# Patient Record
Sex: MALE | Race: BLACK OR AFRICAN AMERICAN
[De-identification: names, ages, dates, MRNs, and addresses within clinical notes are randomized per-mention and may not be internally consistent; named-entity substitution may affect disease eponyms.]

---

## 2022-06-16 ENCOUNTER — HOSPITAL ENCOUNTER (EMERGENCY)
Dept: HOSPITAL 97 - ER | Age: 27
LOS: 1 days | Discharge: HOME | End: 2022-06-17
Payer: COMMERCIAL

## 2022-06-16 DIAGNOSIS — R06.02: ICD-10-CM

## 2022-06-16 DIAGNOSIS — R07.9: Primary | ICD-10-CM

## 2022-06-16 LAB
ALBUMIN SERPL BCP-MCNC: 3.5 G/DL (ref 3.4–5)
ALP SERPL-CCNC: 67 U/L (ref 45–117)
ALT SERPL W P-5'-P-CCNC: 19 U/L (ref 12–78)
AST SERPL W P-5'-P-CCNC: 18 U/L (ref 15–37)
BUN BLD-MCNC: 20 MG/DL (ref 7–18)
GLUCOSE SERPLBLD-MCNC: 150 MG/DL (ref 74–106)
HCT VFR BLD CALC: 38.7 % (ref 39.6–49)
INR BLD: 1.15
LYMPHOCYTES # SPEC AUTO: 2 K/UL (ref 0.7–4.9)
MAGNESIUM SERPL-MCNC: 2.3 MG/DL (ref 1.8–2.4)
NT-PROBNP SERPL-MCNC: 21 PG/ML (ref ?–125)
PMV BLD: 8.5 FL (ref 7.6–11.3)
POTASSIUM SERPL-SCNC: 4.3 MMOL/L (ref 3.5–5.1)
RBC # BLD: 4.71 M/UL (ref 4.33–5.43)
TROPONIN I SERPL HS-MCNC: 5 PG/ML (ref ?–58.9)

## 2022-06-16 PROCEDURE — 36415 COLL VENOUS BLD VENIPUNCTURE: CPT

## 2022-06-16 PROCEDURE — 83880 ASSAY OF NATRIURETIC PEPTIDE: CPT

## 2022-06-16 PROCEDURE — 85025 COMPLETE CBC W/AUTO DIFF WBC: CPT

## 2022-06-16 PROCEDURE — 80076 HEPATIC FUNCTION PANEL: CPT

## 2022-06-16 PROCEDURE — 99284 EMERGENCY DEPT VISIT MOD MDM: CPT

## 2022-06-16 PROCEDURE — 80048 BASIC METABOLIC PNL TOTAL CA: CPT

## 2022-06-16 PROCEDURE — 83735 ASSAY OF MAGNESIUM: CPT

## 2022-06-16 PROCEDURE — 93005 ELECTROCARDIOGRAM TRACING: CPT

## 2022-06-16 PROCEDURE — 84484 ASSAY OF TROPONIN QUANT: CPT

## 2022-06-16 PROCEDURE — 94640 AIRWAY INHALATION TREATMENT: CPT

## 2022-06-16 PROCEDURE — 85610 PROTHROMBIN TIME: CPT

## 2022-06-16 PROCEDURE — 96374 THER/PROPH/DIAG INJ IV PUSH: CPT

## 2022-06-16 PROCEDURE — 71045 X-RAY EXAM CHEST 1 VIEW: CPT

## 2022-06-16 NOTE — RAD REPORT
EXAM DESCRIPTION:  RAD - Chest Single View - 6/16/2022 10:05 pm

 

CLINICAL HISTORY:  sob, chest pain

 

COMPARISON:  No comparisons

 

FINDINGS:  Lines: None.

Lungs: No evidence of edema or pneumonia.

Pleural: No significant pleural effusions or pneumothorax.

Cardiac: The heart size is within normal limits.

Bones: No acute fractures.

Other:

 

IMPRESSION:  No acute cardiopulmonary disease.

## 2022-06-17 VITALS — DIASTOLIC BLOOD PRESSURE: 91 MMHG | SYSTOLIC BLOOD PRESSURE: 147 MMHG

## 2022-06-17 VITALS — TEMPERATURE: 97.6 F

## 2022-06-17 VITALS — OXYGEN SATURATION: 100 %

## 2022-06-17 NOTE — EDPHYS
Physician Documentation                                                                           

 [facility]                                                                 

Name: Alphonse Lugo                                                                               

Age: 26 yrs                                                                                       

Sex: Male                                                                                         

: 1995                                                                                   

MRN: [id number]                                                                                   

Arrival Date: 2022                                                                          

Time: 21:41                                                                                       

Account#: [id number]                                                                            

Bed 15                                                                                            

Private MD:                                                                                       

ED Physician Jose Manuel Hays                                                                      

HPI:                                                                                              

                                                                                             

00:05 This 26 yrs old Black Male presents to ER via EMS with complaints of chest pain,        jmm 

      shortness of breath.                                                                        

00:05 Onset: The symptoms/episode began/occurred acutely, today. This is a 26 year old male   jmm 

      with a history of asthma that presents to the ED with complaints of chest pain and          

      shortness of breath he attributes to asthma. Denies fever or chills. Denies vomiting or     

      abdominal pain.                                                                             

                                                                                                  

Historical:                                                                                       

- Allergies:                                                                                      

                                                                                             

21:49 No Known Allergies;                                                                     ll3 

                                                                                                  

- Immunization history:: Client reports receiving the 2nd dose of the Covid vaccine.              

- Social history:: Smoking status: unknown.                                                       

                                                                                                  

                                                                                                  

ROS:                                                                                              

                                                                                             

00:05 Constitutional: Negative for fever, chills, and weight loss.                            jmm 

      Cardiovascular: Positive for chest pain, with cough.                                        

      Respiratory: Positive for shortness of breath.                                              

      All other systems are negative.                                                             

                                                                                                  

Exam:                                                                                             

00:05 Constitutional:  This is a well developed, well nourished patient who is awake, alert,  jmm 

      and in no acute distress. Head/Face:  atraumatic. Eyes:  EOMI, no conjunctival erythema     

      appreciated ENT:  Moist Mucus Membranes Neck:  Trachea midline, Supple Chest/axilla:        

      Normal chest wall appearance and motion.   Cardiovascular:  Regular rate and rhythm.        

      No edema appreciated                                                                        

00:05 Back:  Normal ROM Skin:  General appearance color normal MS/ Extremity:  Moves all          

      extremities, no obvious deformities appreciated, no edema noted to the lower                

      extremities  Neuro:  Awake and alert Psych:  Behavior is normal, Mood is normal,            

      Patient is cooperative and pleasant                                                         

00:05 Respiratory: the patient does not display signs of respiratory distress,  Respirations:     

      normal, Breath sounds: wheezing: that is mild, is scattered.                                

                                                                                                  

Vital Signs:                                                                                      

                                                                                             

21:45  / 82; Pulse 71; Resp 18; Temp 97.6(TE); Pulse Ox 98% on R/A; Weight 83.91 kg     ll3 

      (R); Height 6 ft. 2 in. (187.96 cm) (R); Pain 6/10;                                         

23:09  / 83; Pulse 79; Resp 13; Pulse Ox 100% ;                                         ll3 

                                                                                             

00:42  / 91; Pulse 81; Resp 19; Pulse Ox 100% on R/A;                                   ll3 

                                                                                             

21:45 Body Mass Index 23.75 (83.91 kg, 187.96 cm)                                             ll3 

                                                                                                  

MDM:                                                                                              

                                                                                             

21:46 Patient medically screened.                                                             [facility] 

                                                                                             

00:07 Data reviewed: vital signs, nurses notes. Counseling: I had a detailed discussion with  [facility] 

      the patient and/or guardian regarding: the historical points, exam findings, and any        

      diagnostic results supporting the discharge/admit diagnosis, lab results, radiology         

      results, the need for outpatient follow up, to return to the emergency department if        

      symptoms worsen or persist or if there are any questions or concerns that arise at          

      home. ED course: Patient states feeling much better, i do not suspect pe. nml o2 and        

      hr, no leg swelling. Labs unremarkable. Patient advised to follow up with pcp and           

      otherwise given strict return precautions. patient understood and agrees with the plan      

      of care. .                                                                                  

                                                                                                  

                                                                                             

21:53 Order name: Basic Metabolic Panel; Complete Time: 22:49                                 [facility] 

                                                                                             

21:53 Order name: CBC with Diff; Complete Time: 22:19                                         [facility] 

                                                                                             

21:53 Order name: LFT's; Complete Time: 22:49                                                 [facility] 

                                                                                             

21:53 Order name: Magnesium; Complete Time: 22:49                                             [facility] 

                                                                                             

21:53 Order name: NT PRO-BNP; Complete Time: 22:49                                            [facility] 

                                                                                             

21:53 Order name: PT-INR; Complete Time: 22:25                                                [facility] 

                                                                                             

21:53 Order name: Troponin HS; Complete Time: 22:49                                           [facility] 

                                                                                             

21:53 Order name: XRAY Chest (1 view); Complete Time: 22:19                                   [facility] 

                                                                                             

21:53 Order name: EKG; Complete Time: 21:54                                                   [facility] 

                                                                                             

21:53 Order name: Cardiac monitoring; Complete Time: 22:08                                    [facility] 

                                                                                             

21:53 Order name: EKG - Nurse/Tech; Complete Time: 22:51                                      [facility] 

                                                                                             

21:53 Order name: IV Saline Lock; Complete Time: 22:08                                        [facility] 

                                                                                             

21:53 Order name: Labs collected and sent; Complete Time: 22:08                               [facility] 

                                                                                             

21:53 Order name: O2 Per Protocol; Complete Time: 22:08                                       [facility] 

                                                                                             

21:53 Order name: O2 Sat Monitoring; Complete Time: 22:08                                     [facility] 

                                                                                                  

Administered Medications:                                                                         

                                                                                             

22:50 Drug: Decadron - Dexamethasone 10 mg Route: IVP; Site: right hand;                      ll3 

23:28 Follow up: Response: No adverse reaction; Marked relief of symptoms                     ll3 

22:51 Drug: DuoNeb (albuterol 2.5 mg, ipratropium 0.5 mg) (3:1) (2.5 mg - 0.5 mg) 3 ml Route: ll3 

      Nebulizer;                                                                                  

23:28 Follow up: Response: No adverse reaction; Marked relief of symptoms                     ll3 

                                                                                                  

                                                                                                  

Disposition:                                                                                      

                                                                                             

08:40 Co-signature as Attending Physician, Jose Manuel Hays MD I agree with the assessment and  baylee 

      plan of care.                                                                               

                                                                                                  

Disposition Summary:                                                                              

22 00:09                                                                                    

Discharge Ordered                                                                                 

      Location: Home                                                                          [facility] 

      Condition: Stable                                                                       [facility] 

      Diagnosis                                                                                   

        - Chest pain, unspecified                                                             [facility] 

        - Shortness of breath                                                                 [facility] 

      Followup:                                                                               [facility] 

        - With: Private Physician                                                                  

        - When: 2 - 3 days                                                                         

        - Reason: Recheck today's complaints, Continuance of care, Re-evaluation by your           

      physician                                                                                   

      Discharge Instructions:                                                                     

        - Discharge Summary Sheet                                                             jm 

        - Nonspecific Chest Pain, Adult                                                       jmm 

        - Shortness of Breath, Adult                                                          jmm 

        - Asthma Attack Prevention, Adult                                                     [facility] 

      Forms:                                                                                      

        - Medication Reconciliation Form                                                      [facility] 

        - Thank You Letter                                                                    [facility] 

        - Antibiotic Education                                                                [facility] 

        - Prescription Opioid Use                                                             [facility] 

      Prescriptions:                                                                              

        - albuterol sulfate 90 mcg/actuation Inhalation HFA aerosol inhaler                        

            - inhale 2 puff by INHALATION route every 4 hours; 1 Pump; Refills: 0, Product    [facility] 

      Selection Permitted                                                                         

        - Prednisone 20 mg Oral Tablet                                                             

            - take 3 tablets by ORAL route once daily for 5 days; 15 tablet; Refills: 0,      [facility] 

      Product Selection Permitted                                                                 

Signatures:                                                                                       

Dispatcher [employer]                           Jose Manuel Arroyo MD MD cha Mickail, Joel, PA PA jmm Loubet, Lynsea, RN                      RN   ll3                                                  

Devi Weir PA PA   sb3                                                  

                                                                                                  

**************************************************************************************************

## 2022-06-17 NOTE — ER
Nurse's Notes                                                                                     

 [facility]                                                                 

Name: Alphonse Lugo                                                                               

Age: 26 yrs                                                                                       

Sex: Male                                                                                         

: 1995                                                                                   

MRN: [id number]                                                                                   

Arrival Date: 2022                                                                          

Time: 21:41                                                                                       

Account#: [id number]                                                                            

Bed 15                                                                                            

Private MD:                                                                                       

Diagnosis: Chest pain, unspecified;Shortness of breath                                            

                                                                                                  

Presentation:                                                                                     

                                                                                             

21:45 Chief complaint: EMS states: Pt c/o chest pain and SOB for the past 2 hours.            ll3 

      Coronavirus screen: Vaccine status: Patient reports receiving the 2nd dose of the covid     

      vaccine. At this time, the client does not indicate any symptoms associated with            

      coronavirus-19. Ebola Screen: No symptoms or risks identified at this time. Initial         

      Sepsis Screen: Does the patient meet any 2 criteria? No. Patient's initial sepsis           

      screen is negative. Does the patient have a suspected source of infection? No.              

      Patient's initial sepsis screen is negative. Risk Assessment: Do you want to hurt           

      yourself or someone else? Patient reports no desire to harm self or others. Onset of        

      symptoms was 2022 at 19:45. Care prior to arrival: Medication(s) given:            

      Albuterol Neb x 1, Atrovent Neb x 1, IV initiated. 20 GA, in the right hand.                

21:45 Method Of Arrival: EMS: [facility] 

21:45 Acuity: ART 3                                                                           ll3 

                                                                                                  

Triage Assessment:                                                                                

21:49 General: Appears uncomfortable, Behavior is calm, cooperative. Pain: Complains of pain  ll3 

      in mid-sternal area Pain currently is 6 out of 10 on a pain scale. Quality of pain is       

      described as sharp, Pain began 2 hours ago. Is continuous, Aggravated by repositioning.     

      Neuro: Level of Consciousness is awake, alert, obeys commands, Oriented to person,          

      place, time, situation, Reports dizziness, headache. Cardiovascular: Chest pain quality     

      is sharp, began 2 hours prior to arrival episodes are continuous. Respiratory: Reports      

      shortness of breath Respiratory effort is even, unlabored, Respiratory pattern is           

      regular, symmetrical. Derm: Skin is pink, warm \T\ dry.                                     

                                                                                                  

Historical:                                                                                       

- Allergies:                                                                                      

21:49 No Known Allergies;                                                                     ll3 

                                                                                                  

- Immunization history:: Client reports receiving the 2nd dose of the Covid vaccine.              

- Social history:: Smoking status: unknown.                                                       

                                                                                                  

                                                                                                  

Screenin:51 Abuse screen: Denies threats or abuse. Nutritional screening: No deficits noted.        ll3 

      Tuberculosis screening: No symptoms or risk factors identified. Fall Risk No fall in        

      past 12 months (0 pts). No secondary diagnosis (0 pts). IV access (20 points).              

      Ambulatory Aid- None/Bed Rest/Nurse Assist (0 pts). Gait- Normal/Bed Rest/Wheelchair (0     

      pts) Mental Status- Oriented to own ability (0 pts). Total Alonso Fall Scale indicates       

      No Risk (0-24 pts).                                                                         

                                                                                                  

Assessment:                                                                                       

21:51 General: See triage assessment.                                                         ll3 

23:09 Reassessment: No changes from previously documented assessment. Patient and/or family   ll3 

      updated on plan of care and expected duration. Pain level reassessed. Patient is alert,     

      oriented x 3, equal unlabored respirations, skin warm/dry/pink.                             

                                                                                             

00:42 Reassessment: No changes from previously documented assessment. Patient and/or family   ll3 

      updated on plan of care and expected duration. Pain level reassessed. Patient is alert,     

      oriented x 3, equal unlabored respirations, skin warm/dry/pink.                             

                                                                                                  

Vital Signs:                                                                                      

                                                                                             

21:45  / 82; Pulse 71; Resp 18; Temp 97.6(TE); Pulse Ox 98% on R/A; Weight 83.91 kg     ll3 

      (R); Height 6 ft. 2 in. (187.96 cm) (R); Pain 6/10;                                         

23:09  / 83; Pulse 79; Resp 13; Pulse Ox 100% ;                                         ll3 

                                                                                             

00:42  / 91; Pulse 81; Resp 19; Pulse Ox 100% on R/A;                                   ll3 

                                                                                             

21:45 Body Mass Index 23.75 (83.91 kg, 187.96 cm)                                             ll3 

                                                                                                  

ED Course:                                                                                        

                                                                                             

21:41 Patient arrived in ED.                                                                  mw2 

21:41 Lino Loera PA is PHCP.                                                              jmm 

21:41 Jose Manuel Hays MD is Attending Physician.                                             jmm 

21:45 Caridad Colon, GEOVANY is Primary Nurse.                                                    ll3 

21:49 Triage completed.                                                                       ll3 

21:49 Arm band placed on Patient placed in an exam room, on a stretcher, on cardiac monitor,  ll3 

      on pulse oximetry.                                                                          

21:51 Patient has correct armband on for positive identification. Bed in low position. Call   ll3 

      light in reach. Side rails up X 1. Client placed on continuous cardiac and pulse            

      oximetry monitoring. NIBP monitoring applied.                                               

21:51 Maintain EMS IV. Dressing intact. Good blood return noted. Site clean \T\ dry. Gauge \T\    ll
3

      site: 20 G R hand.                                                                          

22:06 XRAY Chest (1 view) In Process Unspecified.                                             EDMS

                                                                                             

00:41 No provider procedures requiring assistance completed. IV discontinued, intact,         ll3 

      bleeding controlled, No redness/swelling at site. Pressure dressing applied.                

                                                                                                  

Administered Medications:                                                                         

                                                                                             

22:50 Drug: Decadron - Dexamethasone 10 mg Route: IVP; Site: right hand;                      ll3 

23:28 Follow up: Response: No adverse reaction; Marked relief of symptoms                     ll3 

22:51 Drug: DuoNeb (albuterol 2.5 mg, ipratropium 0.5 mg) (3:1) (2.5 mg - 0.5 mg) 3 ml Route: ll3 

      Nebulizer;                                                                                  

23:28 Follow up: Response: No adverse reaction; Marked relief of symptoms                     ll3 

                                                                                                  

                                                                                                  

Medication:                                                                                       

                                                                                             

00:42 VIS not applicable for this client.                                                     ll3 

                                                                                                  

Outcome:                                                                                          

00:09 Discharge ordered by MD. greco 

00:41 Discharged to Law Enforcement                                                           ll3 

00:41 Condition: stable                                                                           

00:41 Discharge instructions given to patient, police, Instructed on discharge instructions,      

      follow up and referral plans. medication usage, Demonstrated understanding of               

      instructions, follow-up care, medications, Prescriptions given X 2.                         

00:43 Patient left the ED.                                                                    ll3 

                                                                                                  

Signatures:                                                                                       

Dispatcher [employer]                           EDMS                                                 

Lino Loera PA PA jmm [place], [employer]                            mw2                                                  

Caridad Colon, RN                      RN   ll3                                                  

                                                                                                  

**************************************************************************************************

## 2022-06-18 NOTE — EKG
Test Date:    2022-06-16               Test Time:    22:44:48

Technician:   GUERRERO                                    

                                                     

MEASUREMENT RESULTS:                                       

Intervals:                                           

Rate:         66                                     

MS:           148                                    

QRSD:         88                                     

QT:           420                                    

QTc:          440                                    

Axis:                                                

P:            65                                     

MS:           148                                    

QRS:          79                                     

T:            69                                     

                                                     

INTERPRETIVE STATEMENTS:                                       

                                                     

Normal sinus rhythm

Normal ECG

No previous ECG available for comparison



Electronically Signed On 06-18-22 08:55:14 CDT by Rogers Mai